# Patient Record
Sex: FEMALE | Race: WHITE | NOT HISPANIC OR LATINO | Employment: FULL TIME | URBAN - METROPOLITAN AREA
[De-identification: names, ages, dates, MRNs, and addresses within clinical notes are randomized per-mention and may not be internally consistent; named-entity substitution may affect disease eponyms.]

---

## 2017-11-13 LAB
ABO + RH BLD: NORMAL
HBV SURFACE AG SERPL QL IA: NEGATIVE
HCT VFR BLD AUTO: 39 % (ref 36–46)
HGB BLD-MCNC: 12.7 G/DL (ref 12–16)
HIV 1+2 AB+HIV1 P24 AG SERPL QL IA: NORMAL
N GONORRHOEAE, AMPLIFIED DNA: NORMAL
PLATELET # BLD AUTO: 297 K/ΜL (ref 150–399)
RPR: NORMAL
RUBELLA IMMUNE STATUS: NORMAL

## 2018-03-16 LAB — PRENATAL STREP B CULTURE: NORMAL

## 2018-05-11 DIAGNOSIS — Z36.89 ENCOUNTER FOR ULTRASOUND TO CHECK FETAL GROWTH: Primary | ICD-10-CM

## 2018-05-14 ENCOUNTER — OFFICE VISIT (OUTPATIENT)
Dept: MATERNAL FETAL MEDICINE | Facility: CLINIC | Age: 23
End: 2018-05-14
Payer: COMMERCIAL

## 2018-05-14 VITALS
SYSTOLIC BLOOD PRESSURE: 117 MMHG | WEIGHT: 149 LBS | HEIGHT: 64 IN | BODY MASS INDEX: 25.44 KG/M2 | DIASTOLIC BLOOD PRESSURE: 68 MMHG

## 2018-05-14 DIAGNOSIS — Z03.74 SUSPECTED PROBLEM WITH FETAL GROWTH NOT FOUND: ICD-10-CM

## 2018-05-14 DIAGNOSIS — Z36.89 ENCOUNTER FOR ULTRASOUND TO CHECK FETAL GROWTH: ICD-10-CM

## 2018-05-14 PROCEDURE — 99499 UNLISTED E&M SERVICE: CPT | Mod: ,,, | Performed by: OBSTETRICS & GYNECOLOGY

## 2018-05-14 PROCEDURE — 76811 OB US DETAILED SNGL FETUS: CPT | Mod: GT,,, | Performed by: OBSTETRICS & GYNECOLOGY

## 2018-05-14 RX ORDER — SULFAMETHOXAZOLE AND TRIMETHOPRIM 800; 160 MG/1; MG/1
TABLET ORAL
Status: ON HOLD | COMMUNITY
End: 2018-06-02 | Stop reason: HOSPADM

## 2018-05-14 RX ORDER — VALACYCLOVIR HYDROCHLORIDE 1 G/1
TABLET, FILM COATED ORAL
Status: ON HOLD | COMMUNITY
End: 2018-06-02 | Stop reason: HOSPADM

## 2018-05-14 RX ORDER — AZITHROMYCIN 250 MG/1
TABLET, FILM COATED ORAL
Status: ON HOLD | COMMUNITY
End: 2018-06-02 | Stop reason: HOSPADM

## 2018-05-14 RX ORDER — AMOXICILLIN AND CLAVULANATE POTASSIUM 500; 125 MG/1; MG/1
TABLET, FILM COATED ORAL
Status: ON HOLD | COMMUNITY
End: 2018-06-02 | Stop reason: HOSPADM

## 2018-05-24 ENCOUNTER — HOSPITAL ENCOUNTER (OUTPATIENT)
Facility: HOSPITAL | Age: 23
Discharge: HOME | End: 2018-05-24
Attending: OBSTETRICS & GYNECOLOGY | Admitting: OBSTETRICS & GYNECOLOGY
Payer: MEDICAID

## 2018-05-24 DIAGNOSIS — Z33.1 IUP (INTRAUTERINE PREGNANCY), INCIDENTAL: ICD-10-CM

## 2018-05-24 PROCEDURE — 96372 THER/PROPH/DIAG INJ SC/IM: CPT

## 2018-05-24 PROCEDURE — 63600175 PHARM REV CODE 636 W HCPCS: Performed by: OBSTETRICS & GYNECOLOGY

## 2018-05-24 PROCEDURE — 59025 FETAL NON-STRESS TEST: CPT

## 2018-05-24 PROCEDURE — 99211 OFF/OP EST MAY X REQ PHY/QHP: CPT | Mod: 25

## 2018-05-24 RX ORDER — BUTORPHANOL TARTRATE 2 MG/ML
2 INJECTION INTRAMUSCULAR; INTRAVENOUS ONCE
Status: COMPLETED | OUTPATIENT
Start: 2018-05-24 | End: 2018-05-24

## 2018-05-24 RX ADMIN — BUTORPHANOL TARTRATE 2 MG: 2 INJECTION, SOLUTION INTRAMUSCULAR; INTRAVENOUS at 01:05

## 2018-05-24 NOTE — DISCHARGE INSTRUCTIONS
Come to hospital if contractions are 5-7 minutes apart  Notify MD if fever or any significant pain, burning with urination  Come to hospital if fluid leaking or water breaks  Kick count sheet provided- present to ER with any concerns related to movement      Follow up with Dr Drummond as ordered  If you have any concerns please notify us  Call [phone] [facility] with any questions

## 2018-05-24 NOTE — CARE UPDATE
Patient was given paper copy of d/c instructions. Patient has no questions or concerns. All printers on the unit were down at this time.

## 2018-05-30 ENCOUNTER — HOSPITAL ENCOUNTER (INPATIENT)
Facility: HOSPITAL | Age: 23
LOS: 3 days | Discharge: HOME | End: 2018-06-02
Attending: OBSTETRICS & GYNECOLOGY | Admitting: OBSTETRICS & GYNECOLOGY
Payer: MEDICAID

## 2018-05-30 DIAGNOSIS — Z3A.39 39 WEEKS GESTATION OF PREGNANCY: ICD-10-CM

## 2018-05-30 LAB
ABO + RH BLD: NORMAL
AMPHET+METHAMPHET UR QL: NEGATIVE
ANION GAP SERPL CALC-SCNC: 10 MMOL/L
BACTERIA #/AREA URNS HPF: ABNORMAL /HPF
BARBITURATES UR QL SCN>200 NG/ML: NEGATIVE
BASOPHILS # BLD AUTO: 0.04 K/UL
BASOPHILS NFR BLD: 0.3 %
BENZODIAZ UR QL SCN>200 NG/ML: NEGATIVE
BILIRUB UR QL STRIP: NEGATIVE
BLD GP AB SCN CELLS X3 SERPL QL: NORMAL
BUN SERPL-MCNC: 8 MG/DL
BZE UR QL SCN: NEGATIVE
CALCIUM SERPL-MCNC: 9.2 MG/DL
CANNABINOIDS UR QL SCN: NEGATIVE
CHLORIDE SERPL-SCNC: 104 MMOL/L
CLARITY UR: CLEAR
CO2 SERPL-SCNC: 21 MMOL/L
COLOR UR: YELLOW
CREAT SERPL-MCNC: 0.5 MG/DL
CREAT UR-MCNC: 78.3 MG/DL
DIFFERENTIAL METHOD: ABNORMAL
EOSINOPHIL # BLD AUTO: 0.1 K/UL
EOSINOPHIL NFR BLD: 0.4 %
ERYTHROCYTE [DISTWIDTH] IN BLOOD BY AUTOMATED COUNT: 14.1 %
EST. GFR  (AFRICAN AMERICAN): >60 ML/MIN/1.73 M^2
EST. GFR  (NON AFRICAN AMERICAN): >60 ML/MIN/1.73 M^2
GLUCOSE SERPL-MCNC: 78 MG/DL
GLUCOSE UR QL STRIP: NEGATIVE
HCT VFR BLD AUTO: 31.3 %
HGB BLD-MCNC: 10 G/DL
HGB UR QL STRIP: NEGATIVE
IMM GRANULOCYTES # BLD AUTO: 0.22 K/UL
IMM GRANULOCYTES NFR BLD AUTO: 1.8 %
KETONES UR QL STRIP: NEGATIVE
LEUKOCYTE ESTERASE UR QL STRIP: ABNORMAL
LYMPHOCYTES # BLD AUTO: 2.4 K/UL
LYMPHOCYTES NFR BLD: 19.7 %
MCH RBC QN AUTO: 27.9 PG
MCHC RBC AUTO-ENTMCNC: 31.9 G/DL
MCV RBC AUTO: 87 FL
MICROSCOPIC COMMENT: ABNORMAL
MONOCYTES # BLD AUTO: 0.8 K/UL
MONOCYTES NFR BLD: 6.5 %
NEUTROPHILS # BLD AUTO: 8.5 K/UL
NEUTROPHILS NFR BLD: 71.3 %
NITRITE UR QL STRIP: NEGATIVE
NRBC BLD-RTO: 0 /100 WBC
OPIATES UR QL SCN: NEGATIVE
PCP UR QL SCN>25 NG/ML: NEGATIVE
PH UR STRIP: 7 [PH] (ref 5–8)
PLATELET # BLD AUTO: 248 K/UL
PMV BLD AUTO: 11.2 FL
POTASSIUM SERPL-SCNC: 3.4 MMOL/L
PROT UR QL STRIP: NEGATIVE
RBC # BLD AUTO: 3.59 M/UL
RBC #/AREA URNS HPF: 4 /HPF (ref 0–4)
SODIUM SERPL-SCNC: 135 MMOL/L
SP GR UR STRIP: 1.01 (ref 1–1.03)
SQUAMOUS #/AREA URNS HPF: 5 /HPF
TOXICOLOGY INFORMATION: NORMAL
URATE SERPL-MCNC: 3.2 MG/DL
URN SPEC COLLECT METH UR: ABNORMAL
UROBILINOGEN UR STRIP-ACNC: NEGATIVE EU/DL
WBC # BLD AUTO: 11.92 K/UL
WBC #/AREA URNS HPF: 25 /HPF (ref 0–5)

## 2018-05-30 PROCEDURE — 80307 DRUG TEST PRSMV CHEM ANLYZR: CPT

## 2018-05-30 PROCEDURE — 84550 ASSAY OF BLOOD/URIC ACID: CPT

## 2018-05-30 PROCEDURE — 81000 URINALYSIS NONAUTO W/SCOPE: CPT | Mod: 59

## 2018-05-30 PROCEDURE — 36415 COLL VENOUS BLD VENIPUNCTURE: CPT

## 2018-05-30 PROCEDURE — 86901 BLOOD TYPING SEROLOGIC RH(D): CPT

## 2018-05-30 PROCEDURE — 80048 BASIC METABOLIC PNL TOTAL CA: CPT

## 2018-05-30 PROCEDURE — 25000003 PHARM REV CODE 250: Performed by: OBSTETRICS & GYNECOLOGY

## 2018-05-30 PROCEDURE — 85025 COMPLETE CBC W/AUTO DIFF WBC: CPT

## 2018-05-30 PROCEDURE — 87491 CHLMYD TRACH DNA AMP PROBE: CPT

## 2018-05-30 PROCEDURE — 11000001 HC ACUTE MED/SURG PRIVATE ROOM

## 2018-05-30 RX ORDER — TERBUTALINE SULFATE 1 MG/ML
0.25 INJECTION SUBCUTANEOUS
Status: DISCONTINUED | OUTPATIENT
Start: 2018-05-30 | End: 2018-05-31

## 2018-05-30 RX ORDER — OXYTOCIN/RINGER'S LACTATE 20/1000 ML
333 PLASTIC BAG, INJECTION (ML) INTRAVENOUS CONTINUOUS
Status: ACTIVE | OUTPATIENT
Start: 2018-05-31 | End: 2018-05-31

## 2018-05-30 RX ORDER — BUTORPHANOL TARTRATE 2 MG/ML
2 INJECTION INTRAMUSCULAR; INTRAVENOUS EVERY 4 HOURS PRN
Status: DISCONTINUED | OUTPATIENT
Start: 2018-05-30 | End: 2018-05-31

## 2018-05-30 RX ORDER — SODIUM CHLORIDE, SODIUM LACTATE, POTASSIUM CHLORIDE, CALCIUM CHLORIDE 600; 310; 30; 20 MG/100ML; MG/100ML; MG/100ML; MG/100ML
INJECTION, SOLUTION INTRAVENOUS CONTINUOUS
Status: DISCONTINUED | OUTPATIENT
Start: 2018-05-30 | End: 2018-05-31

## 2018-05-30 RX ORDER — OXYTOCIN/RINGER'S LACTATE 20/1000 ML
333 PLASTIC BAG, INJECTION (ML) INTRAVENOUS CONTINUOUS
Status: ACTIVE | OUTPATIENT
Start: 2018-05-30 | End: 2018-05-30

## 2018-05-30 RX ORDER — OXYTOCIN/RINGER'S LACTATE 20/1000 ML
2 PLASTIC BAG, INJECTION (ML) INTRAVENOUS CONTINUOUS
Status: DISCONTINUED | OUTPATIENT
Start: 2018-05-31 | End: 2018-06-02 | Stop reason: HOSPADM

## 2018-05-30 RX ORDER — OXYTOCIN/RINGER'S LACTATE 20/1000 ML
2 PLASTIC BAG, INJECTION (ML) INTRAVENOUS CONTINUOUS
Status: DISCONTINUED | OUTPATIENT
Start: 2018-05-30 | End: 2018-05-30

## 2018-05-30 RX ADMIN — SODIUM CHLORIDE, POTASSIUM CHLORIDE, SODIUM LACTATE AND CALCIUM CHLORIDE: 600; 310; 30; 20 INJECTION, SOLUTION INTRAVENOUS at 09:05

## 2018-05-30 RX ADMIN — MISOPROSTOL 50 MCG: 100 TABLET ORAL at 11:05

## 2018-05-31 ENCOUNTER — ANESTHESIA EVENT (OUTPATIENT)
Dept: OBSTETRICS AND GYNECOLOGY | Facility: HOSPITAL | Age: 23
End: 2018-05-31
Payer: MEDICAID

## 2018-05-31 ENCOUNTER — ANESTHESIA (OUTPATIENT)
Dept: OBSTETRICS AND GYNECOLOGY | Facility: HOSPITAL | Age: 23
End: 2018-05-31
Payer: MEDICAID

## 2018-05-31 PROBLEM — D50.9 IRON DEFICIENCY ANEMIA: Status: ACTIVE | Noted: 2018-05-31

## 2018-05-31 PROBLEM — F41.9 ANXIETY: Status: ACTIVE | Noted: 2018-05-31

## 2018-05-31 LAB
C TRACH DNA SPEC QL NAA+PROBE: NOT DETECTED
N GONORRHOEA DNA SPEC QL NAA+PROBE: NOT DETECTED

## 2018-05-31 PROCEDURE — 25000003 PHARM REV CODE 250: Performed by: OBSTETRICS & GYNECOLOGY

## 2018-05-31 PROCEDURE — 10907ZC DRAINAGE OF AMNIOTIC FLUID, THERAPEUTIC FROM PRODUCTS OF CONCEPTION, VIA NATURAL OR ARTIFICIAL OPENING: ICD-10-PCS | Performed by: OBSTETRICS & GYNECOLOGY

## 2018-05-31 PROCEDURE — 63600175 PHARM REV CODE 636 W HCPCS: Performed by: OBSTETRICS & GYNECOLOGY

## 2018-05-31 PROCEDURE — 63600175 PHARM REV CODE 636 W HCPCS: Performed by: ANESTHESIOLOGY

## 2018-05-31 PROCEDURE — 62326 NJX INTERLAMINAR LMBR/SAC: CPT | Performed by: ANESTHESIOLOGY

## 2018-05-31 PROCEDURE — 27201127 HC VACUUM EXTRACTOR

## 2018-05-31 PROCEDURE — 72100003 HC LABOR CARE, EA. ADDL. 8 HRS

## 2018-05-31 PROCEDURE — 72100002 HC LABOR CARE, 1ST 8 HOURS

## 2018-05-31 PROCEDURE — 59409 OBSTETRICAL CARE: CPT | Mod: AA,,, | Performed by: ANESTHESIOLOGY

## 2018-05-31 PROCEDURE — 72200006 HC VAGINAL DELIVERY LEVEL III

## 2018-05-31 PROCEDURE — 0KQM0ZZ REPAIR PERINEUM MUSCLE, OPEN APPROACH: ICD-10-PCS | Performed by: OBSTETRICS & GYNECOLOGY

## 2018-05-31 PROCEDURE — 51701 INSERT BLADDER CATHETER: CPT

## 2018-05-31 PROCEDURE — 11000001 HC ACUTE MED/SURG PRIVATE ROOM

## 2018-05-31 RX ORDER — SODIUM,POTASSIUM PHOSPHATES 280-250MG
1 POWDER IN PACKET (EA) ORAL ONCE
Status: COMPLETED | OUTPATIENT
Start: 2018-06-01 | End: 2018-06-01

## 2018-05-31 RX ORDER — DIPHENHYDRAMINE HYDROCHLORIDE 50 MG/ML
25 INJECTION INTRAMUSCULAR; INTRAVENOUS EVERY 4 HOURS PRN
Status: DISCONTINUED | OUTPATIENT
Start: 2018-05-31 | End: 2018-06-02 | Stop reason: HOSPADM

## 2018-05-31 RX ORDER — ACETAMINOPHEN 325 MG/1
650 TABLET ORAL EVERY 6 HOURS PRN
Status: DISCONTINUED | OUTPATIENT
Start: 2018-05-31 | End: 2018-06-02 | Stop reason: HOSPADM

## 2018-05-31 RX ORDER — OXYTOCIN/RINGER'S LACTATE 20/1000 ML
41.65 PLASTIC BAG, INJECTION (ML) INTRAVENOUS CONTINUOUS
Status: ACTIVE | OUTPATIENT
Start: 2018-05-31 | End: 2018-05-31

## 2018-05-31 RX ORDER — NAPROXEN 250 MG/1
500 TABLET ORAL EVERY 8 HOURS PRN
Status: DISCONTINUED | OUTPATIENT
Start: 2018-05-31 | End: 2018-06-02 | Stop reason: HOSPADM

## 2018-05-31 RX ORDER — HYDROCORTISONE 25 MG/G
CREAM TOPICAL 3 TIMES DAILY PRN
Status: DISCONTINUED | OUTPATIENT
Start: 2018-05-31 | End: 2018-06-02 | Stop reason: HOSPADM

## 2018-05-31 RX ORDER — ONDANSETRON 4 MG/1
8 TABLET, ORALLY DISINTEGRATING ORAL EVERY 8 HOURS PRN
Status: DISCONTINUED | OUTPATIENT
Start: 2018-05-31 | End: 2018-05-31

## 2018-05-31 RX ORDER — HYDROCODONE BITARTRATE AND ACETAMINOPHEN 10; 325 MG/1; MG/1
1 TABLET ORAL EVERY 4 HOURS PRN
Status: DISCONTINUED | OUTPATIENT
Start: 2018-05-31 | End: 2018-06-02 | Stop reason: HOSPADM

## 2018-05-31 RX ORDER — DIPHENHYDRAMINE HCL 25 MG
25 CAPSULE ORAL EVERY 4 HOURS PRN
Status: DISCONTINUED | OUTPATIENT
Start: 2018-05-31 | End: 2018-06-02 | Stop reason: HOSPADM

## 2018-05-31 RX ORDER — DOCUSATE SODIUM 100 MG/1
200 CAPSULE, LIQUID FILLED ORAL 2 TIMES DAILY PRN
Status: DISCONTINUED | OUTPATIENT
Start: 2018-05-31 | End: 2018-06-02 | Stop reason: HOSPADM

## 2018-05-31 RX ORDER — ROPIVACAINE HYDROCHLORIDE 2 MG/ML
12 INJECTION, SOLUTION EPIDURAL; INFILTRATION; PERINEURAL CONTINUOUS
Status: DISCONTINUED | OUTPATIENT
Start: 2018-05-31 | End: 2018-05-31

## 2018-05-31 RX ADMIN — Medication 2 MILLI-UNITS/MIN: at 12:05

## 2018-05-31 RX ADMIN — ACETAMINOPHEN 650 MG: 325 TABLET ORAL at 04:05

## 2018-05-31 RX ADMIN — SODIUM CHLORIDE, POTASSIUM CHLORIDE, SODIUM LACTATE AND CALCIUM CHLORIDE: 600; 310; 30; 20 INJECTION, SOLUTION INTRAVENOUS at 05:05

## 2018-05-31 RX ADMIN — SODIUM CHLORIDE, POTASSIUM CHLORIDE, SODIUM LACTATE AND CALCIUM CHLORIDE: 600; 310; 30; 20 INJECTION, SOLUTION INTRAVENOUS at 07:05

## 2018-05-31 RX ADMIN — HYDROCODONE BITARTRATE AND ACETAMINOPHEN 1 TABLET: 10; 325 TABLET ORAL at 08:05

## 2018-05-31 RX ADMIN — HYDROCODONE BITARTRATE AND ACETAMINOPHEN 1 TABLET: 10; 325 TABLET ORAL at 12:05

## 2018-05-31 RX ADMIN — SODIUM CHLORIDE, POTASSIUM CHLORIDE, SODIUM LACTATE AND CALCIUM CHLORIDE: 600; 310; 30; 20 INJECTION, SOLUTION INTRAVENOUS at 06:05

## 2018-05-31 RX ADMIN — Medication 2 MILLI-UNITS/MIN: at 03:05

## 2018-05-31 RX ADMIN — DOCUSATE SODIUM 200 MG: 100 CAPSULE, LIQUID FILLED ORAL at 04:05

## 2018-05-31 RX ADMIN — PROMETHAZINE HYDROCHLORIDE 25 MG: 25 INJECTION INTRAMUSCULAR; INTRAVENOUS at 03:05

## 2018-05-31 RX ADMIN — BUTORPHANOL TARTRATE 2 MG: 2 INJECTION, SOLUTION INTRAMUSCULAR; INTRAVENOUS at 03:05

## 2018-05-31 RX ADMIN — ROPIVACAINE HYDROCHLORIDE 12 ML/HR: 2 INJECTION, SOLUTION EPIDURAL; INFILTRATION at 07:05

## 2018-05-31 RX ADMIN — HYDROCODONE BITARTRATE AND ACETAMINOPHEN 1 TABLET: 10; 325 TABLET ORAL at 04:05

## 2018-05-31 NOTE — L&D DELIVERY NOTE
DELIVERY NOTE    DATE OF PROCEDURE:  2018.    DIAGNOSIS:  Uterine pregnancy at 39 weeks, delivered viable male infant,  Apgars 8 and 9.  Cytotec, Pitocin induction, artificial rupture of  membranes, epidural outlet vacuum extraction, midline second-degree  episiotomy repair, borderline IUGR suspected.    PERTINENT HISTORY:  A 22-year-old  1, para 0 at 39 weeks, EDC of  2017, on anxiety.  She was on Wellbutrin, but discontinued.  She had  an abdominal growth on ultrasound of 2%.  At 2%, she was cleared with a  level 2 ultrasound at [facility].  She has history of positive HSV-2  antibodies, no complaints of an outbreak.  Cervix was approximately 2 cm.   Cytotec was administered.  Maximum Pitocin was approximately 10 milliunits  at approximately 3 cm.  She had artificial rupture of membranes, which  revealed clear fluid.  She dilated to 4 cm, received her epidural dilated  to complete, pushed to a +4 station SANTOSH, outlet vacuum extractor placed on  the posterior aspect of the presenting vertex requiring approximately 4  pulls with four simultaneous contractions and rest between contractions,  maximum suction pressure was negative 55 mmHg.  A midline second-degree  episiotomy was performed.  The infant delivered direct OA, suctioned  thoroughly on the perineum.  Shoulders delivered without difficulty.  Cord  was clamped and cut.  Infant placed on maternal abdomen with the nurse in  attendance.  Blood obtained from umbilical cord for Rh status and CBC.   Placenta delivered, Monte intact, LYNDA cord.  Episiotomy sutures were  fashioned with 2-0 chromic.  Vag pack was removed.  Blood pressure,  temperature,  fetal heart tones remained normal throughout labor and  delivery.        DY/IN dd: 2018 11:32:31 (CDT)   td: 2018 17:20:52 (CDT)  Doc ID #[id number]   Job ID #[id number]    CC:

## 2018-05-31 NOTE — SUBJECTIVE & OBJECTIVE
Obstetric HPI:21 yo  39w edc 6/4  Anxiety not on wellb  gth 2%, cleared by ofh level 2 us.  hsv2 antibody pos, no prior h/o hsv          Patient reports Frequency: Every 3 minutes contractions, active fetal movement, Yes vaginal bleeding      This pregnancy has been complicated by anemia, abd circ gth at 2%, cleared by ofh mfm    Obstetric History       T0      L0     SAB0   TAB0   Ectopic0   Multiple0   Live Births0       # Outcome Date GA Lbr Bc/2nd Weight Sex Delivery Anes PTL Lv   1 Current                 Past Medical History:   Diagnosis Date    Anemia     History of anxiety     HSV infection      Past Surgical History:   Procedure Laterality Date    BREAST SURGERY      WISDOM TOOTH EXTRACTION         PTA Medications   Medication Sig    amoxicillin-clavulanate 500-125mg (AUGMENTIN) 500-125 mg Tab Augmentin 500 mg-125 mg tablet   Take 1 tablet every 12 hours by oral route for 10 days.    azelastine (ASTELIN) 137 mcg (0.1 %) nasal spray 1 spray by Nasal route 2 (two) times daily.    azelastine-fluticasone 137-50 mcg/spray Spry nassal spray 1 spray by Each Nare route 2 (two) times daily as needed.    azithromycin (Z-SHASHI) 250 MG tablet azithromycin 250 mg tablet   TAKE 2 TABLETS BY MOUTH TODAY, THEN TAKE 1 TABLET DAILY FOR 4 DAYS    ferrous sulfate (IRON ORAL) Take by mouth.    fexofenadine (ALLEGRA) 180 MG tablet Take 1 tablet (180 mg total) by mouth daily as needed.    omeprazole (PRILOSEC) 40 MG capsule Take 1 capsule (40 mg total) by mouth once daily.    sulfamethoxazole-trimethoprim 800-160mg (BACTRIM DS) 800-160 mg Tab Bactrim  mg-160 mg tablet   Take 1 tablet by oral route twice daily    valACYclovir (VALTREX) 1000 MG tablet Valtrex 1 gram tablet   Take 1 tablet twice a day by oral route for outbreak(s) for 10 days.       Review of patient's allergies indicates:   Allergen Reactions    Latex, natural rubber Itching and Rash        Family History     None         Social History Main Topics    Smoking status: Never Smoker    Smokeless tobacco: Never Used    Alcohol use Yes    Drug use: No    Sexual activity: Yes     Partners: Male     Birth control/ protection: None     Review of Systems   All other systems reviewed and are negative.     Objective:     Vital Signs (Most Recent):  Temp: 98.6 °F (37 °C) (05/31/18 0400)  Pulse: 71 (05/31/18 0430)  Resp: 20 (05/31/18 0400)  BP: 118/72 (05/31/18 0430) Vital Signs (24h Range):  Temp:  [98.2 °F (36.8 °C)-98.6 °F (37 °C)] 98.6 °F (37 °C)  Pulse:  [63-83] 71  Resp:  [19-20] 20  BP: ()/(55-81) 118/72     Weight: 68 kg (150 lb)  Body mass index is 25.75 kg/m².    FHT: 140 Cat 1 (reassuring)  TOCO:  Q 3 minutes    Physical Exam    Cervix:  Dilation:  3  Effacement:  75%  Station: 0  Presentation: Vertex     Significant Labs:  Lab Results   Component Value Date    GROUPTRH A POS 05/30/2018    HEPBSAG Negative 11/13/2017    STREPBCULT neg 03/16/2018       I have personallly reviewed all pertinent lab results from the last 24 hours.

## 2018-05-31 NOTE — ANESTHESIA PROCEDURE NOTES
Epidural    Patient location during procedure: OB   Reason for block: primary anesthetic   Start time: 5/31/2018 7:30 AM  Timeout: 5/31/2018 7:22 AM  Staffing  Anesthesiologist: STEFANIE DOLAN  Preanesthetic Checklist  Completed: patient identified, site marked, surgical consent, pre-op evaluation, timeout performed, IV checked, risks and benefits discussed, monitors and equipment checked, anesthesia consent given, hand hygiene performed and patient being monitored  Preparation  Patient position: sitting  Prep: Betasept  Patient monitoring: ECG and Blood Pressure  Epidural  Skin Anesthetic: lidocaine 1%  Skin Wheal: 5 mL  Administration type: continuous  Approach: midline  Interspace: L2-3  Injection technique: RAYSHAWN air  Needle and Epidural Catheter  Needle type: Tuohy   Needle gauge: 17  Needle length: 5.0 inches  Needle insertion depth: 5 cm  Catheter type: [employer]  Catheter size: 18 G  Catheter at skin depth: 8 cm  Test dose: 5 mL of lidocaine 1.5% with Epi 1-to-200,000  Additional Documentation: incremental injection, negative aspiration for heme and CSF and no paresthesia on injection  Needle localization: anatomical landmarks  Medications:  Bolus administered: 8 mL of 0.2% bupivacaine  Volume per aspiration: 4 mL  Time between aspirations: 5 minutes  Assessment  Upper dermatomal levels - Left: T8  Right: T8   Dermatomal levels determined by alcohol wipe  Patient's tolerance of the procedure: comfortable throughout block and no complaints

## 2018-05-31 NOTE — NURSING
Medication not verified by pharmacy at time ordered.  Medicated at this time with Cytotec 50mcg PO per orders.

## 2018-05-31 NOTE — HOSPITAL COURSE
23 yo  39w edc 6/4                                                                                                    Anxiety not on wellb   abd gth 2%, cleared by ofh level 2 us.  hsv2 antibody pos, no prior h/o hsv     Male apars 8,9 7#1oz  Cytotec, pit , arom ,epedural, ove, ml 2cd epis.      hct 31, 29  k 3.4  ua pos leuko    Delivery dictation [id number]

## 2018-05-31 NOTE — ANESTHESIA POSTPROCEDURE EVALUATION
"Anesthesia Post Evaluation    Patient: Dante Nam    Procedure(s) Performed: * No procedures listed *    Final Anesthesia Type: epidural  Patient location during evaluation: labor & delivery  Patient participation: Yes- Able to Participate  Level of consciousness: awake and alert, oriented and awake  Post-procedure vital signs: reviewed and stable  Pain management: adequate  Airway patency: patent  PONV status at discharge: No PONV  Anesthetic complications: no      Cardiovascular status: blood pressure returned to baseline  Respiratory status: unassisted, spontaneous ventilation and room air  Hydration status: euvolemic  Follow-up not needed.        Visit Vitals  BP (!) 104/56   Pulse 87   Temp 36.6 °C (97.9 °F) (Oral)   Resp (!) 21   Ht 5' 4" (1.626 m)   Wt 68 kg (150 lb)   LMP 08/28/2017 (Approximate)   Breastfeeding? Yes   BMI 25.75 kg/m²       Pain/Shara Score: Pain Rating Prior to Med Admin: 6 (5/31/2018 12:46 PM)  Pain Rating Post Med Admin: 0 (5/31/2018  4:17 AM)      "

## 2018-05-31 NOTE — NURSING
Arrived to unit ambulatory with significant other and pt mother.  Oriented to room, POC, consents, gown change and into bed.  EFM applied for reactive FHT's with accels and no decels, Cat 1 at this time.  [place] applied for irreg ctx 8-10 minutes apart.  Denies pain.  Encouraged questions, answers rcvd per verbalization of understanding.  Verbalized back POC with no further questions at this time.  SR up x 2, brakes on, CB in reach.  In NAD.

## 2018-05-31 NOTE — NURSING
Lab at bedside for draw per orders followed by IV start @ 6510 with #18g jelco to L wrist x 1 stick. Dressed with occlusive tegaderm and secured with paper taper RT Latex allergy.  Tolerated well.

## 2018-05-31 NOTE — H&P
[facility] - Labor and Delivery  Obstetrics  History & Physical    Patient Name: Dante Nam  MRN: [id number]  Admission Date: 2018  Primary Care Provider: Jessy Do III, MD    Subjective:    21 yo  39w edc 6/4  Anxiety not on wellb  abd gth 2%, cleared by ofh level 2 us.  hsv2 antibody pos, no prior h/o hsv     Principal Problem:39 weeks gestation of pregnancy    History of Present Illness:  21 yo  39w edc 6/4  Anxiety not on wellb  abd gth 2%, cleared by ofh level 2 us.  hsv2 antibody pos, no prior h/o hsv    Obstetric HPI:21 yo  39w edc 6/4  Anxiety not on wellb  gth 2%, cleared by ofh level 2 us.  hsv2 antibody pos, no prior h/o hsv          Patient reports Frequency: Every 3 minutes contractions, active fetal movement, Yes vaginal bleeding      This pregnancy has been complicated by anemia, abd circ gth at 2%, cleared by of mfm    Obstetric History       T0      L0     SAB0   TAB0   Ectopic0   Multiple0   Live Births0       # Outcome Date GA Lbr Bc/2nd Weight Sex Delivery Anes PTL Lv   1 Current                 Past Medical History:   Diagnosis Date    Anemia     History of anxiety     HSV infection      Past Surgical History:   Procedure Laterality Date    BREAST SURGERY  2017    WISDOM TOOTH EXTRACTION         PTA Medications   Medication Sig    amoxicillin-clavulanate 500-125mg (AUGMENTIN) 500-125 mg Tab Augmentin 500 mg-125 mg tablet   Take 1 tablet every 12 hours by oral route for 10 days.    azelastine (ASTELIN) 137 mcg (0.1 %) nasal spray 1 spray by Nasal route 2 (two) times daily.    azelastine-fluticasone 137-50 mcg/spray Spry nassal spray 1 spray by Each Nare route 2 (two) times daily as needed.    azithromycin (Z-SHASHI) 250 MG tablet azithromycin 250 mg tablet   TAKE 2 TABLETS BY MOUTH TODAY, THEN TAKE 1 TABLET DAILY FOR 4 DAYS    ferrous sulfate (IRON ORAL) Take by mouth.    fexofenadine (ALLEGRA) 180 MG tablet Take 1 tablet  (180 mg total) by mouth daily as needed.    omeprazole (PRILOSEC) 40 MG capsule Take 1 capsule (40 mg total) by mouth once daily.    sulfamethoxazole-trimethoprim 800-160mg (BACTRIM DS) 800-160 mg Tab Bactrim  mg-160 mg tablet   Take 1 tablet by oral route twice daily    valACYclovir (VALTREX) 1000 MG tablet Valtrex 1 gram tablet   Take 1 tablet twice a day by oral route for outbreak(s) for 10 days.       Review of patient's allergies indicates:   Allergen Reactions    Latex, natural rubber Itching and Rash        Family History     None        Social History Main Topics    Smoking status: Never Smoker    Smokeless tobacco: Never Used    Alcohol use Yes    Drug use: No    Sexual activity: Yes     Partners: Male     Birth control/ protection: None     Review of Systems   All other systems reviewed and are negative.     Objective:     Vital Signs (Most Recent):  Temp: 98.6 °F (37 °C) (18 0400)  Pulse: 71 (18 0430)  Resp: 20 (18 0400)  BP: 118/72 (18 0430) Vital Signs (24h Range):  Temp:  [98.2 °F (36.8 °C)-98.6 °F (37 °C)] 98.6 °F (37 °C)  Pulse:  [63-83] 71  Resp:  [19-20] 20  BP: ()/(55-81) 118/72     Weight: 68 kg (150 lb)  Body mass index is 25.75 kg/m².    FHT: 140 Cat 1 (reassuring)  TOCO:  Q 3 minutes    Physical Exam    Cervix:  Dilation:  3  Effacement:  75%  Station: 0  Presentation: Vertex     Significant Labs:  Lab Results   Component Value Date    GROUPTRH A POS 2018    HEPBSAG Negative 2017    STREPBCULT neg 2018       I have personallly reviewed all pertinent lab results from the last 24 hours.    Assessment/Plan:     22 y.o. female  at 39w3d for induction  Fetal well being  anticpate vag del  Epidural at  4cm    No notes have been filed under this hospital service.  Service: Obstetrics and Gynecology      Josué Drummond MD  Obstetrics  [facility] - Labor and Delivery

## 2018-05-31 NOTE — L&D DELIVERY NOTE
Delivery Information for  Filemon Nam    Birth information:  YOB: 2018   Time of birth: 11:05 AM   Sex: male   Head Delivery Date/Time:     Delivery type:    Gestational Age: 39w3d              [place] Assessment    No data filed                          Interventions/Resuscitation         Cord    No data filed              Labor Events:       labor: No     Labor Onset Date/Time: 2018 03:00     Dilation Complete Date/Time:         Start Pushing Date/Time:       Rupture Date/Time:              Rupture type:           Fluid Amount: small      Fluid Color: clear      Fluid Odor: none      Membrane Status (PeriCalm): ARM (Artificial Rupture)      Rupture Date/Time (PeriCalm):        Fluid Amount (PeriCalm): Small      Fluid Color (PeriCalm): Clear       steroids: None     Antibiotics given for GBS: No     Induction: oxytocin     Indications for induction:  Elective     Augmentation:       Indications for augmentation:       Labor complications:       Additional complications:          Cervical ripenin2018 11:30 PM      Misoprostol          Delivery:      Episiotomy:       Indication for Episiotomy:       Perineal Lacerations:   Repaired:      Periurethral Laceration:   Repaired:     Labial Laceration:   Repaired:     Sulcus Laceration:   Repaired:     Vaginal Laceration:   Repaired:     Cervical Laceration:   Repaired:     Repair suture:       Repair # of packets:       Vaginal delivery QBL (mL):        QBL (mL): 0     Combined Blood Loss (mL): 0     Vaginal Sweep Performed:       Surgicount Correct:         Other providers:            Details (if applicable):  Trial of Labor      Categorization:      Priority:     Indications for :     Incision Type:       Additional  information:  Forceps:    Vacuum:    Breech:    Observed anomalies    Other (Comments):

## 2018-05-31 NOTE — NURSING
Dr. Drummond at  for morning rounds.  Reviewed POC with pt; pt and family verbalized understanding.  Asst pt with positioning supine for AROM.  AROM for small amt clear fluid.  Pericare completed.  Tolerated well.  Instructed to call for asst for BRP.  Verb understanding.

## 2018-05-31 NOTE — ANESTHESIA PREPROCEDURE EVALUATION
05/31/2018  Dante Nam is a 22 y.o., female.    Anesthesia Evaluation    I have reviewed the Patient Summary Reports.    I have reviewed the Nursing Notes.   I have reviewed the Medications.     Review of Systems  Anesthesia Hx:  No previous Anesthesia  Neg history of prior surgery. Denies Family Hx of Anesthesia complications.   Denies Personal Hx of Anesthesia complications.   Social:  Non-Smoker    Hematology/Oncology:  Hematology Normal   Oncology Normal     EENT/Dental:EENT/Dental Normal   Cardiovascular:  Cardiovascular Normal     Pulmonary:  Pulmonary Normal    Renal/:  Renal/ Normal     Hepatic/GI:  Hepatic/GI Normal    Musculoskeletal:  Musculoskeletal Normal    Neurological:  Neurology Normal    Endocrine:  Endocrine Normal    Dermatological:  Skin Normal    Psych:  Psychiatric Normal           Physical Exam  General:  Well nourished    Airway/Jaw/Neck:  AIRWAY FINDINGS: Normal      Eyes/Ears/Nose:  EYES/EARS/NOSE FINDINGS: Normal   Dental:  DENTAL FINDINGS: Normal   Chest/Lungs:  Chest/Lungs Clear    Heart/Vascular:  Heart Findings: Normal Heart murmur: negative Vascular Findings: Normal    Abdomen:  Abdomen Findings: Normal    Musculoskeletal:  Musculoskeletal Findings: Normal   Skin:  Skin Findings: Normal    Mental Status:  Mental Status Findings: Normal        Anesthesia Plan  Type of Anesthesia, risks & benefits discussed:  Anesthesia Type:  epidural  Patient's Preference:   Intra-op Monitoring Plan: standard ASA monitors  Intra-op Monitoring Plan Comments:   Post Op Pain Control Plan:   Post Op Pain Control Plan Comments:   Induction:    Beta Blocker:  Patient is not currently on a Beta-Blocker (No further documentation required).       Informed Consent: Patient representative understands risks and agrees with Anesthesia plan.  Questions answered. Anesthesia consent signed with  patient representative.  ASA Score: 2  emergent   Day of Surgery Review of History & Physical:    H&P update referred to the provider.         Ready For Surgery From Anesthesia Perspective.

## 2018-05-31 NOTE — HPI
23 yo  39w edc 6/4  Anxiety not on wellb  abd gth 2%, cleared by ofh level 2 us.  hsv2 antibody pos, no prior h/o hsv

## 2018-06-01 LAB
BASOPHILS # BLD AUTO: 0.06 K/UL
BASOPHILS NFR BLD: 0.4 %
DIFFERENTIAL METHOD: ABNORMAL
EOSINOPHIL # BLD AUTO: 0.1 K/UL
EOSINOPHIL NFR BLD: 0.8 %
ERYTHROCYTE [DISTWIDTH] IN BLOOD BY AUTOMATED COUNT: 14.3 %
HCT VFR BLD AUTO: 29.1 %
HGB BLD-MCNC: 9.5 G/DL
IMM GRANULOCYTES # BLD AUTO: 0.18 K/UL
IMM GRANULOCYTES NFR BLD AUTO: 1.2 %
LYMPHOCYTES # BLD AUTO: 2.2 K/UL
LYMPHOCYTES NFR BLD: 13.9 %
MCH RBC QN AUTO: 28.4 PG
MCHC RBC AUTO-ENTMCNC: 32.6 G/DL
MCV RBC AUTO: 87 FL
MONOCYTES # BLD AUTO: 1 K/UL
MONOCYTES NFR BLD: 6.4 %
NEUTROPHILS # BLD AUTO: 12.1 K/UL
NEUTROPHILS NFR BLD: 77.3 %
NRBC BLD-RTO: 0 /100 WBC
PLATELET # BLD AUTO: 204 K/UL
PMV BLD AUTO: 11.4 FL
RBC # BLD AUTO: 3.35 M/UL
WBC # BLD AUTO: 15.64 K/UL

## 2018-06-01 PROCEDURE — 85025 COMPLETE CBC W/AUTO DIFF WBC: CPT

## 2018-06-01 PROCEDURE — 11000001 HC ACUTE MED/SURG PRIVATE ROOM

## 2018-06-01 PROCEDURE — 36415 COLL VENOUS BLD VENIPUNCTURE: CPT

## 2018-06-01 PROCEDURE — 25000003 PHARM REV CODE 250: Performed by: OBSTETRICS & GYNECOLOGY

## 2018-06-01 RX ADMIN — POTASSIUM & SODIUM PHOSPHATES POWDER PACK 280-160-250 MG 1 PACKET: 280-160-250 PACK at 01:06

## 2018-06-01 RX ADMIN — HYDROCODONE BITARTRATE AND ACETAMINOPHEN 1 TABLET: 10; 325 TABLET ORAL at 11:06

## 2018-06-01 RX ADMIN — NAPROXEN 500 MG: 250 TABLET ORAL at 05:06

## 2018-06-01 RX ADMIN — HYDROCODONE BITARTRATE AND ACETAMINOPHEN 1 TABLET: 10; 325 TABLET ORAL at 09:06

## 2018-06-01 RX ADMIN — HYDROCODONE BITARTRATE AND ACETAMINOPHEN 1 TABLET: 10; 325 TABLET ORAL at 01:06

## 2018-06-01 RX ADMIN — HYDROCODONE BITARTRATE AND ACETAMINOPHEN 1 TABLET: 10; 325 TABLET ORAL at 06:06

## 2018-06-01 RX ADMIN — HYDROCODONE BITARTRATE AND ACETAMINOPHEN 1 TABLET: 10; 325 TABLET ORAL at 05:06

## 2018-06-01 RX ADMIN — DOCUSATE SODIUM 200 MG: 100 CAPSULE, LIQUID FILLED ORAL at 01:06

## 2018-06-01 NOTE — SUBJECTIVE & OBJECTIVE
Hospital course: 21 yo  39w edc 6/4                                                                                                    Anxiety not on wellb   abd gth 2%, cleared by of level 2 us.  hsv2 antibody pos, no prior h/o hsv     Male apars 8,9 7#1oz  Cytotec, pit , arom ,epedural, ove, ml 2cd epis.      hct 31, 29  k 3.4  ua pos leuko    Delivery dictation [id number]    Interval History:    She is doing well this morning. She is tolerating a regular diet without nausea or vomiting. She is voiding spontaneously. She is ambulating. She has passed flatus, and has not a BM. Vaginal bleeding is mild. She denies fever or chills. Abdominal pain is mild and controlled with oral medications. She is breastfeeding. She desires circumcision for her male baby: yes.    Objective:     Vital Signs (Most Recent):  Temp: 97.2 °F (36.2 °C) (18)  Pulse: 71 (18)  Resp: 17 (18)  BP: 117/72 (18)  SpO2: 98 % (18 0038) Vital Signs (24h Range):  Temp:  [97.2 °F (36.2 °C)-99.2 °F (37.3 °C)] 97.2 °F (36.2 °C)  Pulse:  [70-90] 71  Resp:  [17-18] 17  SpO2:  [97 %-99 %] 98 %  BP: ()/(37-93) 117/72     Weight: 68 kg (150 lb)  Body mass index is 25.75 kg/m².      Intake/Output Summary (Last 24 hours) at 18 0752  Last data filed at 18 1800   Gross per 24 hour   Intake                0 ml   Output             1450 ml   Net            -1450 ml       Significant Labs:  Lab Results   Component Value Date    GROUPTRH A POS 2018    HEPBSAG Negative 2017    STREPBCULT neg 2018       Recent Labs  Lab 18  0539   HGB 9.5*   HCT 29.1*       I have personallly reviewed all pertinent lab results from the last 24 hours.    Physical Exam:    HENT:   Head: Normocephalic.    Eyes: EOM are normal.    Neck: Normal range of motion.    Cardiovascular: Normal rate, regular rhythm and normal heart sounds.     Pulmonary/Chest: Breath sounds normal.        Abdominal: Soft.      Genitourinary: Vagina normal.                Skin: Skin is warm.    Psychiatric: She has a normal mood and affect.

## 2018-06-01 NOTE — PROGRESS NOTES
"PPD#1 S/P     Subjective: No complaints    Objective: /61 (BP Location: Right arm, Patient Position: Lying)   Pulse 77   Temp 97.4 °F (36.3 °C) (Oral)   Resp 18   Ht 5' 4" (1.626 m)   Wt 68 kg (150 lb)   LMP 2017 (Approximate)   SpO2 97%   Breastfeeding? Yes   BMI 25.75 kg/m²     H/H:   Lab Results   Component Value Date    WBC 15.64 (H) 2018    HGB 9.5 (L) 2018    HCT 29.1 (L) 2018    MCV 87 2018     2018       Fundus: Firm, non- tender  Lochia: Moderate  Extremities: Non-tender, no edema    Assessment: PPD #1 S/P     Plan: Routine progressive care    "

## 2018-06-01 NOTE — PROGRESS NOTES
s- no complaints  o-afeb vss  Lung cl corrr fh 29 and firm  Legs bnontedner  hct 29  A-doing well   p-dc in am

## 2018-06-01 NOTE — SUBJECTIVE & OBJECTIVE
"Hospital course: 21 yo  39w edc 6/4                                                                                                    Anxiety not on wellb   abd gth 2%, cleared by of level 2 us.  hsv2 antibody pos, no prior h/o hsv     Male apars 8,9 7#1oz  Cytotec, pit , arom ,epedural, ove, ml 2cd epis.      hct 31, 29  k 3.4  ua pos leuko    Delivery dictation [id number]    Interval History:     She is doing well this morning. She is tolerating a regular diet without nausea or vomiting. She is voiding spontaneously. She is ambulating. She has passed flatus, and has a BM. Vaginal bleeding is mild. She denies fever or chills. Abdominal pain is mild and controlled with oral medications. She is breastfeeding. She desires circumcision for her male baby: .    Objective:     Vital Signs (Most Recent):  Temp: 97.4 °F (36.3 °C) (18 0756)  Pulse: 77 (18 075)  Resp: 18 (18 075)  BP: 108/61 (18 075)  SpO2: 97 % (18 0756) Vital Signs (24h Range):  Temp:  [97.2 °F (36.2 °C)-99.2 °F (37.3 °C)] 97.4 °F (36.3 °C)  Pulse:  [70-90] 77  Resp:  [17-18] 18  SpO2:  [97 %-99 %] 97 %  BP: ()/(37-93) 108/61     Weight: 68 kg (150 lb)  Body mass index is 25.75 kg/m².      Intake/Output Summary (Last 24 hours) at 18 0757  Last data filed at 18 1800   Gross per 24 hour   Intake                0 ml   Output             1450 ml   Net            -1450 ml       Significant Labs:  Lab Results   Component Value Date    GROUPTRH A POS 2018    HEPBSAG Negative 2017    STREPBCULT neg 2018       Recent Labs  Lab 18  0539   HGB 9.5*   HCT 29.1*       I have personallly reviewed all pertinent lab results from the last 24 hours.  PPD#1 S/P     Subjective: No complaints    Objective: /61 (BP Location: Right arm, Patient Position: Lying)   Pulse 77   Temp 97.4 °F (36.3 °C) (Oral)   Resp 18   Ht 5' 4" (1.626 m)   Wt 68 kg (150 lb)   LMP 2017 (Approximate)   " SpO2 97%   Breastfeeding? Yes   BMI 25.75 kg/m²     H/H:   Lab Results   Component Value Date    WBC 15.64 (H) 2018    HGB 9.5 (L) 2018    HCT 29.1 (L) 2018    MCV 87 2018     2018       Fundus: Firm, non- tender  Lochia: Moderate  Extremities: Non-tender, no edema    Assessment: PPD #1 S/P     Plan: Routine progressive care    Physical Exam

## 2018-06-02 VITALS
DIASTOLIC BLOOD PRESSURE: 75 MMHG | SYSTOLIC BLOOD PRESSURE: 118 MMHG | HEART RATE: 82 BPM | HEIGHT: 64 IN | RESPIRATION RATE: 18 BRPM | TEMPERATURE: 97 F | WEIGHT: 150 LBS | BODY MASS INDEX: 25.61 KG/M2 | OXYGEN SATURATION: 99 %

## 2018-06-02 PROBLEM — O23.40 URINARY TRACT INFECTION AFFECTING PREGNANCY: Status: ACTIVE | Noted: 2018-06-02

## 2018-06-02 PROCEDURE — 25000003 PHARM REV CODE 250: Performed by: OBSTETRICS & GYNECOLOGY

## 2018-06-02 RX ORDER — NAPROXEN 500 MG/1
500 TABLET ORAL EVERY 8 HOURS PRN
Qty: 30 TABLET | Refills: 1 | Status: SHIPPED | OUTPATIENT
Start: 2018-06-02 | End: 2021-03-31

## 2018-06-02 RX ORDER — HYDROCODONE BITARTRATE AND ACETAMINOPHEN 10; 325 MG/1; MG/1
1 TABLET ORAL EVERY 4 HOURS PRN
Qty: 20 TABLET | Refills: 0 | Status: SHIPPED | OUTPATIENT
Start: 2018-06-02 | End: 2021-03-31

## 2018-06-02 RX ORDER — DOCUSATE SODIUM 100 MG/1
200 CAPSULE, LIQUID FILLED ORAL 2 TIMES DAILY PRN
Refills: 0 | COMMUNITY
Start: 2018-06-02 | End: 2021-03-31

## 2018-06-02 RX ORDER — NITROFURANTOIN 25; 75 MG/1; MG/1
100 CAPSULE ORAL EVERY 12 HOURS
Status: DISCONTINUED | OUTPATIENT
Start: 2018-06-02 | End: 2018-06-02 | Stop reason: HOSPADM

## 2018-06-02 RX ADMIN — NITROFURANTOIN (MONOHYDRATE/MACROCRYSTALS) 100 MG: 75; 25 CAPSULE ORAL at 08:06

## 2018-06-02 RX ADMIN — NAPROXEN 500 MG: 250 TABLET ORAL at 12:06

## 2018-06-02 RX ADMIN — NAPROXEN 500 MG: 250 TABLET ORAL at 08:06

## 2018-06-02 RX ADMIN — DOCUSATE SODIUM 200 MG: 100 CAPSULE, LIQUID FILLED ORAL at 12:06

## 2018-06-02 RX ADMIN — HYDROCODONE BITARTRATE AND ACETAMINOPHEN 1 TABLET: 10; 325 TABLET ORAL at 08:06

## 2018-06-02 NOTE — DISCHARGE SUMMARY
[facility] - Post Partum  Obstetrics  Discharge Summary      Patient Name: Dante Nam  MRN: [id number]  Admission Date: 2018  Hospital Length of Stay: 3 days  Discharge Date and Time:  2018 7:27 AM  Attending Physician: Josué Drummond MD   Discharging Provider: Josué Drummond MD  Primary Care Provider: Jessy Do III, MD    HPI: 21 yo  39w edc 6/4                                                                                                    Anxiety not on wellb   abd gth 2%, cleared by [facility] level 2 us.  hsv2 antibody pos, no prior h/o hsv     Male apars 8,9 7#1oz  Cytotec, pit , arom ,epedural, ove, ml 2cd epis.      hct 31, 29  k 3.4  ua pos leuko    * No surgery found *     Hospital Course: nl post partum course        Final Active Diagnoses:    Diagnosis Date Noted POA    PRINCIPAL PROBLEM:  39 weeks gestation of pregnancy [Z3A.39] 2018 Not Applicable    Urinary tract infection affecting pregnancy [O23.40] 2018 Unknown    Anxiety [F41.9] 2018 Unknown    Iron deficiency anemia [D50.9] 2018 Unknown      Problems Resolved During this Admission:    Diagnosis Date Noted Date Resolved POA        Labs: All labs within the past 24 hours have been reviewed    Feeding Method: breast    Immunizations     Date Immunization Status Dose Route/Site Given by    18 1221 Tdap Incomplete 0.5 mL Intramuscular/Left deltoid           Delivery:    Episiotomy: Median   Lacerations: 2nd   Repair suture:     Repair # of packets:     Blood loss (ml): 300     Birth information:  YOB: 2018   Time of birth: 11:05 AM   Sex: male   Delivery type: Vaginal, Vacuum (Extractor)   Gestational Age: 39w3d    Delivery Clinician:      Other providers:       Additional  information:  Forceps:    Vacuum:    Breech:    Observed anomalies Vacuum assisted VD, uneventful.      Living?: Living          APGARS  One minute Five minutes Ten minutes   Skin color: 1   1        Heart rate: 2   2       Grimace: 2   2       Muscle tone: 2   2       Breathin   2       Totals: 9  9        Placenta: Delivered:       appearance    Pending Diagnostic Studies:     None          Discharged Condition: good    Disposition: Home or Self Care    Follow Up:  Follow-up Information     Josué Drummond MD.    Specialty:  Obstetrics and Gynecology  Contact information:  [address]  [facility] [address]  [phone]                 Patient Instructions:     Diet Adult Regular     Activity as tolerated     Lifting restrictions   Order Comments: Lift less than 20 lbs     Other restrictions (specify):   Order Comments: No sex x 6 wks     Notify your health care provider if you experience any of the following:  temperature >100.4     Notify your health care provider if you experience any of the following:  severe uncontrolled pain     Notify your health care provider if you experience any of the following:  redness, tenderness, or signs of infection (pain, swelling, redness, odor or green/yellow discharge around incision site)     Notify your health care provider if you experience any of the following:  difficulty breathing or increased cough     Notify your health care provider if you experience any of the following:  severe persistent headache     Notify your health care provider if you experience any of the following:   Order Comments: Increased pain     Change dressing (specify)   Order Comments: If aquasel dressing, do not remove dressing. And you may shower with it on.  It will be removed in the office at one week.       Medications:  Current Discharge Medication List      START taking these medications    Details   docusate sodium (COLACE) 100 MG capsule Take 2 capsules (200 mg total) by mouth 2 (two) times daily as needed for Constipation.  Refills: 0      HYDROcodone-acetaminophen (NORCO)  mg per tablet Take 1 tablet by mouth every 4 (four) hours as  needed.  Qty: 20 tablet, Refills: 0      naproxen (NAPROSYN) 500 MG tablet Take 1 tablet (500 mg total) by mouth every 8 (eight) hours as needed (Cramping).  Qty: 30 tablet, Refills: 1         CONTINUE these medications which have NOT CHANGED    Details   ferrous sulfate (IRON ORAL) Take by mouth.      fexofenadine (ALLEGRA) 180 MG tablet Take 1 tablet (180 mg total) by mouth daily as needed.  Qty: 30 tablet, Refills: 5         STOP taking these medications       amoxicillin-clavulanate 500-125mg (AUGMENTIN) 500-125 mg Tab Comments:   Reason for Stopping:         azelastine (ASTELIN) 137 mcg (0.1 %) nasal spray Comments:   Reason for Stopping:         azelastine-fluticasone 137-50 mcg/spray [place] nassal spray Comments:   Reason for Stopping:         azithromycin (Z-SHASHI) 250 MG tablet Comments:   Reason for Stopping:         omeprazole (PRILOSEC) 40 MG capsule Comments:   Reason for Stopping:         sulfamethoxazole-trimethoprim 800-160mg (BACTRIM DS) 800-160 mg Tab Comments:   Reason for Stopping:         valACYclovir (VALTREX) 1000 MG tablet Comments:   Reason for Stopping:               Josué Drummond MD  [facility] - Post Partum

## 2018-06-02 NOTE — DISCHARGE SUMMARY
[facility] - Post Partum  Obstetrics  Discharge Summary      Patient Name: Dante Nam  MRN: [id number]  Admission Date: 2018  Hospital Length of Stay: 3 days  Discharge Date and Time:  2018 7:20 AM  Attending Physician: Josué Drummond MD   Discharging Provider: Josué Drummond MD  Primary Care Provider: Jessy Do III, MD    HPI: 21 yo  39w edc 6/4                                                                                                    Anxiety not on wellb   abd gth 2%, cleared by [facility] level 2 us.  hsv2 antibody pos, no prior h/o hsv     Male apars 8,9 7#1oz  Cytotec, pit , arom ,epedural, ove, ml 2cd epis.      hct 31, 29  k 3.4  ua pos leuko    * No surgery found *     Hospital Course: nl post partum course        Final Active Diagnoses:    Diagnosis Date Noted POA    PRINCIPAL PROBLEM:  39 weeks gestation of pregnancy [Z3A.39] 2018 Not Applicable    Anxiety [F41.9] 2018 Unknown    Iron deficiency anemia [D50.9] 2018 Unknown      Problems Resolved During this Admission:    Diagnosis Date Noted Date Resolved POA        Labs: BMP: No results for input(s): GLU, NA, K, CL, CO2, BUN, CREATININE, CALCIUM, MG in the last 48 hours.    Feeding Method: breast    Immunizations     Date Immunization Status Dose Route/Site Given by    18 1221 Tdap Incomplete 0.5 mL Intramuscular/Left deltoid           Delivery:    Episiotomy: Median   Lacerations: 2nd   Repair suture:     Repair # of packets:     Blood loss (ml): 300     Birth information:  YOB: 2018   Time of birth: 11:05 AM   Sex: male   Delivery type: Vaginal, Vacuum (Extractor)   Gestational Age: 39w3d    Delivery Clinician:      Other providers:       Additional  information:  Forceps:    Vacuum:    Breech:    Observed anomalies Vacuum assisted VD, uneventful.      Living?: Living          APGARS  One minute Five minutes Ten minutes   Skin color: 1   1       Heart rate: 2   2        Grimace: 2   2       Muscle tone: 2   2       Breathin   2       Totals: 9  9        Placenta: Delivered:       appearance    Pending Diagnostic Studies:     None          Discharged Condition: good    Disposition:     Follow Up:    Patient Instructions:   No discharge procedures on file.  Medications:  Current Discharge Medication List      CONTINUE these medications which have NOT CHANGED    Details   amoxicillin-clavulanate 500-125mg (AUGMENTIN) 500-125 mg Tab Augmentin 500 mg-125 mg tablet   Take 1 tablet every 12 hours by oral route for 10 days.      azelastine (ASTELIN) 137 mcg (0.1 %) nasal spray 1 spray by Nasal route 2 (two) times daily.      azelastine-fluticasone 137-50 mcg/spray [place] nassal spray 1 spray by Each Nare route 2 (two) times daily as needed.  Qty: 23 g, Refills: 11      azithromycin (Z-SHASHI) 250 MG tablet azithromycin 250 mg tablet   TAKE 2 TABLETS BY MOUTH TODAY, THEN TAKE 1 TABLET DAILY FOR 4 DAYS      ferrous sulfate (IRON ORAL) Take by mouth.      fexofenadine (ALLEGRA) 180 MG tablet Take 1 tablet (180 mg total) by mouth daily as needed.  Qty: 30 tablet, Refills: 5      omeprazole (PRILOSEC) 40 MG capsule Take 1 capsule (40 mg total) by mouth once daily.  Qty: 30 capsule, Refills: 11      sulfamethoxazole-trimethoprim 800-160mg (BACTRIM DS) 800-160 mg Tab Bactrim  mg-160 mg tablet   Take 1 tablet by oral route twice daily      valACYclovir (VALTREX) 1000 MG tablet Valtrex 1 gram tablet   Take 1 tablet twice a day by oral route for outbreak(s) for 10 days.             Josué Drummond MD  Obstetrics  [facility] - Post Partum

## 2018-06-02 NOTE — NURSING
Patient given d/c instructions at this time.  She v/u of all instructions.  Patient given prescriptions for norco, anaprox, colace, and macrobid.  Patient will ambulate from unit when she is ready to leave per her request.  No distress noted.  Respirations are even and unlabored.  No complaints at this time.  Will continue to monitor.

## 2018-06-04 NOTE — PLAN OF CARE
06/04/18 1508   Final Note   Assessment Type Final Discharge Note   Discharge Disposition Home   What phone number can be called within the next 1-3 days to see how you are doing after discharge? [phone]   Hospital Follow Up  Appt(s) scheduled? Yes   Discharge plans and expectations educations in teach back method with documentation complete? Yes   Called patient & reminded of follow up appointment that she made with doctor office.Verbalized understanding.

## 2018-07-14 LAB — CHLAMYDIA TRACHOMATIS AMP DNA: NEGATIVE

## 2019-05-13 PROBLEM — Z3A.39 39 WEEKS GESTATION OF PREGNANCY: Status: RESOLVED | Noted: 2018-05-30 | Resolved: 2019-05-13

## 2021-03-31 ENCOUNTER — OFFICE VISIT (OUTPATIENT)
Dept: FAMILY MEDICINE | Facility: CLINIC | Age: 26
End: 2021-03-31
Payer: COMMERCIAL

## 2021-03-31 VITALS
BODY MASS INDEX: 22.88 KG/M2 | HEART RATE: 99 BPM | SYSTOLIC BLOOD PRESSURE: 119 MMHG | HEIGHT: 64 IN | RESPIRATION RATE: 16 BRPM | TEMPERATURE: 98 F | DIASTOLIC BLOOD PRESSURE: 82 MMHG | OXYGEN SATURATION: 98 % | WEIGHT: 134 LBS

## 2021-03-31 DIAGNOSIS — Z76.89 ENCOUNTER TO ESTABLISH CARE: Primary | ICD-10-CM

## 2021-03-31 DIAGNOSIS — Z71.85 HPV VACCINE COUNSELING: ICD-10-CM

## 2021-03-31 DIAGNOSIS — K59.04 CHRONIC IDIOPATHIC CONSTIPATION: ICD-10-CM

## 2021-03-31 DIAGNOSIS — R58 INTERNAL HEMORRHAGE: ICD-10-CM

## 2021-03-31 PROCEDURE — 3008F PR BODY MASS INDEX (BMI) DOCUMENTED: ICD-10-PCS | Mod: S$GLB,,, | Performed by: NURSE PRACTITIONER

## 2021-03-31 PROCEDURE — 90651 9VHPV VACCINE 2/3 DOSE IM: CPT | Mod: S$GLB,,, | Performed by: NURSE PRACTITIONER

## 2021-03-31 PROCEDURE — 90471 IMMUNIZATION ADMIN: CPT | Mod: S$GLB,,, | Performed by: NURSE PRACTITIONER

## 2021-03-31 PROCEDURE — 99204 OFFICE O/P NEW MOD 45 MIN: CPT | Mod: 25,S$GLB,, | Performed by: NURSE PRACTITIONER

## 2021-03-31 PROCEDURE — 90651 HPV VACCINE 9-VALENT 3 DOSE IM: ICD-10-PCS | Mod: S$GLB,,, | Performed by: NURSE PRACTITIONER

## 2021-03-31 PROCEDURE — 99204 PR OFFICE/OUTPT VISIT, NEW, LEVL IV, 45-59 MIN: ICD-10-PCS | Mod: 25,S$GLB,, | Performed by: NURSE PRACTITIONER

## 2021-03-31 PROCEDURE — 1125F AMNT PAIN NOTED PAIN PRSNT: CPT | Mod: S$GLB,,, | Performed by: NURSE PRACTITIONER

## 2021-03-31 PROCEDURE — 3008F BODY MASS INDEX DOCD: CPT | Mod: S$GLB,,, | Performed by: NURSE PRACTITIONER

## 2021-03-31 PROCEDURE — 90471 HPV VACCINE 9-VALENT 3 DOSE IM: ICD-10-PCS | Mod: S$GLB,,, | Performed by: NURSE PRACTITIONER

## 2021-03-31 PROCEDURE — 1125F PR PAIN SEVERITY QUANTIFIED, PAIN PRESENT: ICD-10-PCS | Mod: S$GLB,,, | Performed by: NURSE PRACTITIONER

## 2021-03-31 RX ORDER — BUPROPION HYDROCHLORIDE 150 MG/1
150 TABLET, EXTENDED RELEASE ORAL 2 TIMES DAILY
COMMUNITY
Start: 2021-03-01 | End: 2024-01-23

## 2021-03-31 RX ORDER — VALACYCLOVIR HYDROCHLORIDE 500 MG/1
500 TABLET, FILM COATED ORAL DAILY
COMMUNITY
End: 2021-04-27 | Stop reason: SDUPTHER

## 2021-03-31 RX ORDER — FLUTICASONE PROPIONATE 50 MCG
SPRAY, SUSPENSION (ML) NASAL
COMMUNITY
Start: 2021-03-17 | End: 2024-01-23

## 2021-04-08 ENCOUNTER — PATIENT OUTREACH (OUTPATIENT)
Dept: ADMINISTRATIVE | Facility: HOSPITAL | Age: 26
End: 2021-04-08

## 2021-04-08 DIAGNOSIS — Z11.59 NEED FOR HEPATITIS C SCREENING TEST: ICD-10-CM

## 2022-05-31 ENCOUNTER — PATIENT MESSAGE (OUTPATIENT)
Dept: ADMINISTRATIVE | Facility: HOSPITAL | Age: 27
End: 2022-05-31
Payer: COMMERCIAL

## 2022-06-09 DIAGNOSIS — Z11.59 NEED FOR HEPATITIS C SCREENING TEST: ICD-10-CM

## 2022-09-12 ENCOUNTER — HOSPITAL ENCOUNTER (EMERGENCY)
Facility: HOSPITAL | Age: 27
Discharge: HOME | End: 2022-09-12
Payer: COMMERCIAL

## 2022-09-12 VITALS
BODY MASS INDEX: 23.9 KG/M2 | WEIGHT: 140 LBS | DIASTOLIC BLOOD PRESSURE: 80 MMHG | OXYGEN SATURATION: 99 % | TEMPERATURE: 98 F | SYSTOLIC BLOOD PRESSURE: 115 MMHG | RESPIRATION RATE: 20 BRPM | HEIGHT: 64 IN | HEART RATE: 89 BPM

## 2022-09-12 DIAGNOSIS — L74.0 HEAT RASH: Primary | ICD-10-CM

## 2022-09-12 PROCEDURE — 99282 EMERGENCY DEPT VISIT SF MDM: CPT

## 2022-09-12 RX ORDER — NYSTATIN 100000 U/G
CREAM TOPICAL 2 TIMES DAILY
Qty: 20 G | Refills: 0 | Status: SHIPPED | OUTPATIENT
Start: 2022-09-12 | End: 2024-01-23

## 2022-09-12 NOTE — Clinical Note
"Dante Ramos" Cyril was seen and treated in our emergency department on 9/12/2022.  She may return to work on 09/13/2022.       If you have any questions or concerns, please don't hesitate to call.      Stas Allen NP"

## 2022-09-12 NOTE — ED PROVIDER NOTES
Encounter Date: 9/12/2022       History     Chief Complaint   Patient presents with    painful hemorrhoid      Patient reports painful hemorrhoid x several months . She is scheduled to see Dr Flores tomorrow .      Patient 26-year-old female presents emergency room with questionable hemorrhoid pain.  Patient states she has been using prescription hemorrhoid foam by primary care provider which is not helping, pain is only progressively gotten worse.  Patient states she has had this problem in the past.  Patient states she has been outside for several weeks coating and has had significant sweating.  Patient denies any dark tarry or bloody stools, constipation, dysuria, fevers, chills, shortness of breath or any other   Complaint other than stated above.  Review of patient's allergies indicates:   Allergen Reactions    Ortho-cyclen (21) Nausea Only    Latex Hives    Latex, natural rubber Itching and Rash     Past Medical History:   Diagnosis Date    Anemia     Bipolar disorder     Depression     History of anxiety     HSV infection      Past Surgical History:   Procedure Laterality Date    AUGMENTATION OF BREAST  2017    WISDOM TOOTH EXTRACTION  2014     Family History   Problem Relation Age of Onset    Bipolar disorder Mother     No Known Problems Father     No Known Problems Brother     Breast cancer Maternal Grandmother     Breast cancer Maternal Aunt     Colon cancer Neg Hx      Social History     Tobacco Use    Smoking status: Never    Smokeless tobacco: Never   Substance Use Topics    Alcohol use: Yes    Drug use: No     Review of Systems   Constitutional: Negative.    HENT: Negative.     Eyes: Negative.    Respiratory: Negative.     Cardiovascular: Negative.    Gastrointestinal:  Positive for rectal pain.   Endocrine: Negative.    Genitourinary: Negative.    Musculoskeletal: Negative.    Skin:  Negative for color change, rash and wound.   Allergic/Immunologic: Negative for food allergies.   Neurological:  Negative.    Hematological: Negative.    Psychiatric/Behavioral: Negative.     All other systems reviewed and are negative.    Physical Exam     Initial Vitals [09/12/22 1327]   BP Pulse Resp Temp SpO2   113/83 95 18 98.6 °F (37 °C) 98 %      MAP       --         Physical Exam    Nursing note and vitals reviewed.  Constitutional: She appears well-nourished. She is not diaphoretic. No distress.   HENT:   Head: Normocephalic and atraumatic.   Eyes: Pupils are equal, round, and reactive to light.   Neck:   Normal range of motion.  Cardiovascular:  Normal rate.           Pulmonary/Chest: No respiratory distress.   Musculoskeletal:      Cervical back: Normal range of motion.     Neurological: She is oriented to person, place, and time. She has normal strength. GCS score is 15. GCS eye subscore is 4. GCS verbal subscore is 5. GCS motor subscore is 6.   Skin: Skin is warm and dry. Rash (Rash noted to inner buttocks folds from Anus  up the entire crease  Bilaterally.) noted. No abscess noted.   No hemorrhoids noted on exam.  Chest significant rash with skin eschar a shin noted as described.   Psychiatric: She has a normal mood and affect.       ED Course   Procedures  Labs Reviewed - No data to display       Imaging Results    None          Medications - No data to display  Medical Decision Making:   Initial Assessment:    Patient seen examined emergency room.  Patient has significant rash noted on enter butt cheeks secondary to heat in his great shin.  No other significant findings found , assessment noted above.  Differential Diagnosis:     Hemorrhoids   Thrombosed hemorrhoid   rash  ED Management:   Patient examined, advised patient she will need to keep the area dry.  May try mixture of desitin and nystatin as well as   Non adhering of 4x4s to prevent skin from rubbing together.  Avoid excessive sweating.                        Clinical Impression:   Final diagnoses:  [L74.0] Heat rash (Primary)      ED Disposition  Condition    Discharge Stable          ED Prescriptions       Medication Sig Dispense Start Date End Date Auth. Provider    nystatin (MYCOSTATIN) cream Apply topically 2 (two) times daily. Mixed one-to-one with Desitin. 20 g 9/12/2022 -- Stas Allen NP          Follow-up Information       Follow up With Specialties Details Why Contact Info    Lynn Daniel NP Family Medicine In 1 week If symptoms worsen, As needed [address] [facility]  [address] FLO[address]  [facility] [place] [address] [phone]               Stas Allen NP  09/12/22 0127

## 2022-09-12 NOTE — DISCHARGE INSTRUCTIONS
Use medication as prescribed.  Use nonadhesive of 4x4s to prevent skin from rubbing together.   Follow-up primary care provider since are not improving in 3-5 days.    Follow-up emergency room if continued symptoms continue, worsen or you develop any signs symptoms of infection, he is fevers unresolved by Tylenol or ibuprofen, or any other worsening symptoms.

## 2022-11-30 ENCOUNTER — HOSPITAL ENCOUNTER (EMERGENCY)
Facility: HOSPITAL | Age: 27
Discharge: HOME | End: 2022-11-30
Attending: EMERGENCY MEDICINE
Payer: COMMERCIAL

## 2022-11-30 VITALS
OXYGEN SATURATION: 100 % | BODY MASS INDEX: 24.75 KG/M2 | DIASTOLIC BLOOD PRESSURE: 54 MMHG | HEART RATE: 69 BPM | RESPIRATION RATE: 16 BRPM | HEIGHT: 64 IN | TEMPERATURE: 98 F | SYSTOLIC BLOOD PRESSURE: 105 MMHG | WEIGHT: 145 LBS

## 2022-11-30 DIAGNOSIS — R10.9 RIGHT FLANK PAIN: Primary | ICD-10-CM

## 2022-11-30 DIAGNOSIS — R19.5 LOOSE STOOLS: ICD-10-CM

## 2022-11-30 DIAGNOSIS — R11.2 NAUSEA AND VOMITING, UNSPECIFIED VOMITING TYPE: ICD-10-CM

## 2022-11-30 LAB
ALBUMIN SERPL BCP-MCNC: 4.2 G/DL (ref 3.5–5.2)
ALP SERPL-CCNC: 56 U/L (ref 55–135)
ALT SERPL W/O P-5'-P-CCNC: 12 U/L (ref 10–44)
ANION GAP SERPL CALC-SCNC: 13 MMOL/L (ref 8–16)
AST SERPL-CCNC: 10 U/L (ref 10–40)
B-HCG UR QL: NEGATIVE
BASOPHILS # BLD AUTO: 0.07 K/UL (ref 0–0.2)
BASOPHILS NFR BLD: 0.6 % (ref 0–1.9)
BILIRUB SERPL-MCNC: 0.6 MG/DL (ref 0.1–1)
BILIRUB UR QL STRIP: NEGATIVE
BUN SERPL-MCNC: 12 MG/DL (ref 6–20)
CALCIUM SERPL-MCNC: 9.4 MG/DL (ref 8.7–10.5)
CHLORIDE SERPL-SCNC: 103 MMOL/L (ref 95–110)
CLARITY UR: CLEAR
CO2 SERPL-SCNC: 25 MMOL/L (ref 23–29)
COLOR UR: YELLOW
CREAT SERPL-MCNC: 0.8 MG/DL (ref 0.5–1.4)
DIFFERENTIAL METHOD: ABNORMAL
EOSINOPHIL # BLD AUTO: 0.1 K/UL (ref 0–0.5)
EOSINOPHIL NFR BLD: 1 % (ref 0–8)
ERYTHROCYTE [DISTWIDTH] IN BLOOD BY AUTOMATED COUNT: 12.4 % (ref 11.5–14.5)
EST. GFR  (NO RACE VARIABLE): >60 ML/MIN/1.73 M^2
GLUCOSE SERPL-MCNC: 100 MG/DL (ref 70–110)
GLUCOSE UR QL STRIP: NEGATIVE
HCT VFR BLD AUTO: 39.8 % (ref 37–48.5)
HGB BLD-MCNC: 13.2 G/DL (ref 12–16)
HGB UR QL STRIP: NEGATIVE
IMM GRANULOCYTES # BLD AUTO: 0.1 K/UL (ref 0–0.04)
IMM GRANULOCYTES NFR BLD AUTO: 0.9 % (ref 0–0.5)
KETONES UR QL STRIP: NEGATIVE
LEUKOCYTE ESTERASE UR QL STRIP: NEGATIVE
LYMPHOCYTES # BLD AUTO: 2.8 K/UL (ref 1–4.8)
LYMPHOCYTES NFR BLD: 24.2 % (ref 18–48)
MCH RBC QN AUTO: 31 PG (ref 27–31)
MCHC RBC AUTO-ENTMCNC: 33.2 G/DL (ref 32–36)
MCV RBC AUTO: 93 FL (ref 82–98)
MONOCYTES # BLD AUTO: 0.6 K/UL (ref 0.3–1)
MONOCYTES NFR BLD: 5.3 % (ref 4–15)
NEUTROPHILS # BLD AUTO: 7.9 K/UL (ref 1.8–7.7)
NEUTROPHILS NFR BLD: 68 % (ref 38–73)
NITRITE UR QL STRIP: NEGATIVE
NRBC BLD-RTO: 0 /100 WBC
PH UR STRIP: 6 [PH] (ref 5–8)
PLATELET # BLD AUTO: 273 K/UL (ref 150–450)
PMV BLD AUTO: 10.1 FL (ref 9.2–12.9)
POTASSIUM SERPL-SCNC: 3.5 MMOL/L (ref 3.5–5.1)
PROT SERPL-MCNC: 7.1 G/DL (ref 6–8.4)
PROT UR QL STRIP: NEGATIVE
RBC # BLD AUTO: 4.26 M/UL (ref 4–5.4)
SODIUM SERPL-SCNC: 141 MMOL/L (ref 136–145)
SP GR UR STRIP: >=1.03 (ref 1–1.03)
URN SPEC COLLECT METH UR: ABNORMAL
UROBILINOGEN UR STRIP-ACNC: NEGATIVE EU/DL
WBC # BLD AUTO: 11.65 K/UL (ref 3.9–12.7)

## 2022-11-30 PROCEDURE — 96375 TX/PRO/DX INJ NEW DRUG ADDON: CPT

## 2022-11-30 PROCEDURE — 99285 EMERGENCY DEPT VISIT HI MDM: CPT | Mod: 25

## 2022-11-30 PROCEDURE — 80053 COMPREHEN METABOLIC PANEL: CPT | Performed by: EMERGENCY MEDICINE

## 2022-11-30 PROCEDURE — 74176 CT ABD & PELVIS W/O CONTRAST: CPT | Mod: 26,,, | Performed by: RADIOLOGY

## 2022-11-30 PROCEDURE — 74176 CT ABDOMEN PELVIS WITHOUT CONTRAST: ICD-10-PCS | Mod: 26,,, | Performed by: RADIOLOGY

## 2022-11-30 PROCEDURE — 85025 COMPLETE CBC W/AUTO DIFF WBC: CPT | Performed by: EMERGENCY MEDICINE

## 2022-11-30 PROCEDURE — 96361 HYDRATE IV INFUSION ADD-ON: CPT

## 2022-11-30 PROCEDURE — 25000003 PHARM REV CODE 250: Performed by: EMERGENCY MEDICINE

## 2022-11-30 PROCEDURE — 63600175 PHARM REV CODE 636 W HCPCS: Performed by: EMERGENCY MEDICINE

## 2022-11-30 PROCEDURE — 81003 URINALYSIS AUTO W/O SCOPE: CPT | Performed by: EMERGENCY MEDICINE

## 2022-11-30 PROCEDURE — 81025 URINE PREGNANCY TEST: CPT | Performed by: EMERGENCY MEDICINE

## 2022-11-30 PROCEDURE — 36415 COLL VENOUS BLD VENIPUNCTURE: CPT | Performed by: EMERGENCY MEDICINE

## 2022-11-30 PROCEDURE — 74176 CT ABD & PELVIS W/O CONTRAST: CPT | Mod: TC

## 2022-11-30 PROCEDURE — 96374 THER/PROPH/DIAG INJ IV PUSH: CPT

## 2022-11-30 RX ORDER — ONDANSETRON 2 MG/ML
4 INJECTION INTRAMUSCULAR; INTRAVENOUS
Status: COMPLETED | OUTPATIENT
Start: 2022-11-30 | End: 2022-11-30

## 2022-11-30 RX ORDER — KETOROLAC TROMETHAMINE 30 MG/ML
30 INJECTION, SOLUTION INTRAMUSCULAR; INTRAVENOUS
Status: COMPLETED | OUTPATIENT
Start: 2022-11-30 | End: 2022-11-30

## 2022-11-30 RX ORDER — ONDANSETRON 4 MG/1
4 TABLET, ORALLY DISINTEGRATING ORAL EVERY 6 HOURS PRN
Qty: 12 TABLET | Refills: 0 | Status: SHIPPED | OUTPATIENT
Start: 2022-11-30 | End: 2024-01-23

## 2022-11-30 RX ADMIN — KETOROLAC TROMETHAMINE 30 MG: 30 INJECTION, SOLUTION INTRAMUSCULAR at 05:11

## 2022-11-30 RX ADMIN — SODIUM CHLORIDE 1000 ML: 0.9 INJECTION, SOLUTION INTRAVENOUS at 05:11

## 2022-11-30 RX ADMIN — ONDANSETRON HYDROCHLORIDE 4 MG: 2 SOLUTION INTRAMUSCULAR; INTRAVENOUS at 05:11

## 2022-11-30 NOTE — DISCHARGE INSTRUCTIONS
As we discussed, take Zofran for nausea, and take over-the-counter Imodium for loose stools.  Take Tylenol and Motrin for pain or fever.  Follow-up with your doctors, follow a bland diet for the next several days, and return here as needed or if worse

## 2022-11-30 NOTE — Clinical Note
"Dante Ramos" Cyril was seen and treated in our emergency department on 11/30/2022.  She may return to work on 12/05/2022.       If you have any questions or concerns, please don't hesitate to call.      Quang Cook MD"

## 2022-11-30 NOTE — ED PROVIDER NOTES
Encounter Date: 11/30/2022       History     Chief Complaint   Patient presents with    Flank Pain     R flank pain x3 days, along with n/v      Pt here with right flank pain rad to RLQ onset 3days ago, now with NV. No dysuria or hematuria. NO hx of renal stones. She denies being pregnant. Pain 8/10.    The history is provided by the patient.   Review of patient's allergies indicates:   Allergen Reactions    Ortho-cyclen (21) Nausea Only    Latex Hives    Latex, natural rubber Itching and Rash     Past Medical History:   Diagnosis Date    Anemia     Bipolar disorder     Depression     History of anxiety     HSV infection      Past Surgical History:   Procedure Laterality Date    AUGMENTATION OF BREAST  2017    WISDOM TOOTH EXTRACTION  2014     Family History   Problem Relation Age of Onset    Bipolar disorder Mother     No Known Problems Father     No Known Problems Brother     Breast cancer Maternal Grandmother     Breast cancer Maternal Aunt     Colon cancer Neg Hx      Social History     Tobacco Use    Smoking status: Never    Smokeless tobacco: Never   Substance Use Topics    Alcohol use: Yes    Drug use: No     Review of Systems   Constitutional:  Negative for fever.   Gastrointestinal:  Positive for abdominal pain, nausea and vomiting.   Genitourinary:  Positive for flank pain.   Musculoskeletal:  Positive for back pain.   All other systems reviewed and are negative.    Physical Exam     Initial Vitals [11/30/22 0519]   BP Pulse Resp Temp SpO2   109/81 69 16 98.3 °F (36.8 °C) 99 %      MAP       --         Physical Exam    Nursing note and vitals reviewed.  Constitutional: She appears well-developed and well-nourished.   uncomfortable   Eyes: No scleral icterus.   Neck: Neck supple.   Normal range of motion.  Cardiovascular:  Normal rate, regular rhythm, normal heart sounds and intact distal pulses.           Pulmonary/Chest: Breath sounds normal.   Abdominal: Abdomen is soft. Bowel sounds are normal. There is  no abdominal tenderness.   +RCVAT   Musculoskeletal:         General: No tenderness or edema. Normal range of motion.      Cervical back: Normal range of motion and neck supple.     Neurological: She is alert and oriented to person, place, and time.   Skin: Skin is warm and dry. Capillary refill takes less than 2 seconds. No rash noted. No erythema. No pallor.       ED Course   Procedures  Labs Reviewed   CBC W/ AUTO DIFFERENTIAL - Abnormal; Notable for the following components:       Result Value    Immature Granulocytes 0.9 (*)     Gran # (ANC) 7.9 (*)     Immature Grans (Abs) 0.10 (*)     All other components within normal limits   URINALYSIS, REFLEX TO URINE CULTURE - Abnormal; Notable for the following components:    Specific Gravity, UA >=1.030 (*)     All other components within normal limits    Narrative:     Preferred Collection Type->Urine, Clean Catch  Specimen Source->Urine   COMPREHENSIVE METABOLIC PANEL   PREGNANCY TEST, URINE RAPID    Narrative:     Specimen Source->Urine          Imaging Results              CT Abdomen Pelvis  Without Contrast (Final result)  Result time 11/30/22 08:18:52      Final result by Jammie Londono MD (11/30/22 08:18:52)                   Impression:      No renal or ureteral calculus or obstruction.  No findings to indicate appendicitis.    Gallstone.      Electronically signed by: Jammie Londono  Date:    11/30/2022  Time:    08:18               Narrative:    EXAMINATION:  CT ABDOMEN PELVIS WITHOUT CONTRAST    CLINICAL HISTORY:  Flank pain, kidney stone suspected; right flank pain x3 days.    TECHNIQUE:  Low dose axial images, sagittal and coronal reformations were obtained from the lung bases to the pubic symphysis.  Neither oral nor IV contrast was administered.    COMPARISON:  None    FINDINGS:  The visualized portions of the lung bases are clear.    The liver, spleen, adrenal glands, pancreas are normal.  A gallstone is noted.  No CT evidence of inflammation  around the gallbladder.  No biliary ductal dilatation.    Kidneys are normal.  No renal or ureteral calculi are present, and there is no hydronephrosis.    Urinary bladder is collapsed.  IUD noted in uterus.  Aorta is normal in caliber.    No dilated or inflamed loops of bowel are present.  The appendix is difficult to identify with certainty, but no right lower quadrant inflammation is seen to suggest a diagnosis of appendicitis.  There is no free fluid or free air.                                       Medications   sodium chloride 0.9% bolus 1,000 mL (0 mLs Intravenous Stopped 11/30/22 0622)   ketorolac injection 30 mg (30 mg Intravenous Given 11/30/22 0524)   ondansetron injection 4 mg (4 mg Intravenous Given 11/30/22 0524)     Medical Decision Making:   Differential Diagnosis:   UTI, pyelo, renal colic  ED Management:  DR. Cook: Patient care assumed from Dr. Grey at shift change.  Patient's labs are unremarkable.  No evidence of UTI or pyelo.  She denies any vaginal discharge or bleeding.  She has had some nausea with vomiting, and loose stools.  Her CT shows no evidence of ureteral calculus.  No evidence of appendicitis, she does have a solitary gallstone, but alkaline phosphatase, ALT, AST, and bilirubin are normal.  Patient's symptoms suggest a viral etiology.  Will provide a prescription for Zofran and she will use over-the-counter Imodium as needed.  Tylenol and Motrin for pain and/or fever.  Follow-up with PCP and return here as needed or if worse in any way.  Signed out to Dr Cook at changes of shift to f/u CT and dispo.                         Clinical Impression:   Final diagnoses:  [R10.9] Right flank pain (Primary)  [R19.5] Loose stools  [R11.2] Nausea and vomiting, unspecified vomiting type      ED Disposition Condition    Discharge Stable          ED Prescriptions       Medication Sig Dispense Start Date End Date Auth. Provider    ondansetron (ZOFRAN-ODT) 4 MG TbDL Take 1 tablet (4 mg total)  by mouth every 6 (six) hours as needed (Nausea and vomiting). 12 tablet 11/30/2022 -- Quang Cook MD          Follow-up Information       Follow up With Specialties Details Why Contact Info    Lynn Daniel NP Family Medicine Call today  [address] [facility]  [address]ND FLO[address]  [facility] [address] [phone]      [facility] Emergency Dept Emergency Medicine  As needed, If symptoms worsen [address] [facility] [address] [phone]             Quang Cook MD  11/30/22 0852